# Patient Record
Sex: FEMALE | Race: WHITE | Employment: UNEMPLOYED | URBAN - METROPOLITAN AREA
[De-identification: names, ages, dates, MRNs, and addresses within clinical notes are randomized per-mention and may not be internally consistent; named-entity substitution may affect disease eponyms.]

---

## 2019-05-13 ENCOUNTER — OFFICE VISIT (OUTPATIENT)
Dept: SURGERY | Age: 34
End: 2019-05-13

## 2019-05-13 VITALS
SYSTOLIC BLOOD PRESSURE: 118 MMHG | DIASTOLIC BLOOD PRESSURE: 82 MMHG | HEART RATE: 66 BPM | TEMPERATURE: 98.9 F | WEIGHT: 205 LBS | BODY MASS INDEX: 30.36 KG/M2 | HEIGHT: 69 IN

## 2019-05-13 DIAGNOSIS — R10.33 PERIUMBILICAL ABDOMINAL PAIN: Primary | ICD-10-CM

## 2019-05-13 NOTE — PROGRESS NOTES
Surgery Consult:  Periumbilical pain Requesting physician:  Petty Pritchrad NP Subjective:  
Patient 28 y.o.  female presents with right periumbilical pain for 5 days. patient has constant dull achy pain with intermittent sharp pain which is worse with lifting and twisting. Patient has mild intermittent nausea but no emesis. Denies any bulge. Patient denies any recent trauma but [place]. Patient reports have loose BM, but no diarrhea. Prior abdominal surgeries include abdominoplasty and umbilical hernia repair. Past Medical & Surgical History: 
Past Medical History:  
Diagnosis Date  Anxiety  Other ill-defined conditions(799.89) Preventative mastectomy bilateral  
 Postpartum depression  Unspecified breast disorder   
 brca pos. bilateral mastectomy Past Surgical History:  
Procedure Laterality Date  BREAST SURGERY PROCEDURE UNLISTED  HX BREAST AUGMENTATION Bilateral 1/20/2016 REVISION BILATERAL BREAST RECONSTRUCTION WITH IMPLANT EXCHANGE AND AUTOLOGUS TISSUE FAT TRANSFER, REVISION RIGHT NIPPLE RECONSTRUCTION, UMBILICAL HERNIA REPAIR, EXCISON SKIN SUBCUTANEOUS TISSUE OF ABDOMINAL WALL performed by Lalo Ochoa MD at [facility] MAIN OR  
 HX BREAST AUGMENTATION Bilateral 1/20/2016 BREAST REVISION WITH LIPOSUCTION AND FAT INJECTION performed by Lalo Ochoa MD at [address] HX BREAST RECONSTRUCTION  11/5/2013 BREAST RECONSTRUCTION performed by Lalo Ochoa MD at [address] HX BREAST RECONSTRUCTION  11/18/2013 DEBRIDEMENT OF BILATERAL BREAST  performed by Lalo Ochoa MD at [address] HX BREAST RECONSTRUCTION  2/6/2014 BILATERAL TISSUE EXCHANGE TO PERMANENT IMPLANTS/ REVISION BILATERAL RECONSTRUCTION W/ FAT GRAFTING  performed by Lalo Ochoa MD at [address] HX BREAST RECONSTRUCTION  2/6/2014  BREAST TISSUE EXPANDER INSERTION/EXCHANGE performed by Lalo Ochoa MD at [address] [facility] HX BREAST RECONSTRUCTION  5/21/2014 BILATERAL NIPPLE RECONSTRUCTION, REVISION BILATERAL BREAST RECONSTRUCTION WITH FAT GRAFTING performed by Desiree Anderson MD at [address] [facility] HX BREAST RECONSTRUCTION  8/18/2014 REVISION BILATERAL BREAST RECONSTRUCTION WITH FAT GRAFTING, BILATERAL NIPPLE RECONSTRUCTION, RIGHT IMPLANT EXCHANGE AND FLEX HD PLACEMENT performed by Desiree Anderson MD at [address] [facility] HX MODIFIED RADICAL MASTECTOMY  11/5/2013 BILATERAL SKIN AND NIPPLE SPARING MASTECTOMY, BILATERAL BREAST RECONSTRUCTION WITH TISSUE EXPANDERS AND FLEX HD performed by Lauren Delgado MD at [address] [facility] Social History: 
Social History Socioeconomic History  Marital status:  Spouse name: Not on file  Number of children: Not on file  Years of education: Not on file  Highest education level: Not on file Occupational History  Not on file Social Needs  Financial resource strain: Not on file  Food insecurity:  
  Worry: Not on file Inability: Not on file  Transportation needs:  
  Medical: Not on file Non-medical: Not on file Tobacco Use  Smoking status: Former Smoker Packs/day: 0.25 Years: 5.00 Pack years: 1.25  Smokeless tobacco: Former User Substance and Sexual Activity  Alcohol use: Yes Comment: twice a year  Drug use: No  
 Sexual activity: Yes  
  Partners: Male Birth control/protection: IUD Lifestyle  Physical activity:  
  Days per week: Not on file Minutes per session: Not on file  Stress: Not on file Relationships  Social connections:  
  Talks on phone: Not on file Gets together: Not on file Attends Baptism service: Not on file Active member of club or organization: Not on file Attends meetings of clubs or organizations: Not on file Relationship status: Not on file  Intimate partner violence:  
  Fear of current or ex partner: Not on file Emotionally abused: Not on file Physically abused: Not on file Forced sexual activity: Not on file Other Topics Concern  Not on file Social History Narrative  Not on file Family History: 
Family History Problem Relation Age of Onset  Cancer Mother 39  
     breast  
 Heart Disease Mother  Cancer Maternal Grandmother 39  
     breast  
 Cancer Other   
     breast  
  
 
Medications: 
Current Outpatient Medications Medication Sig  
 HYDROmorphone (DILAUDID) 2 mg tablet Take 1 Tab by mouth every four (4) hours as needed for Pain. Max Daily Amount: 12 mg.  ibuprofen (MOTRIN) 600 mg tablet Take 1 Tab by mouth every six (6) hours as needed for Pain.  oxyCODONE-acetaminophen (PERCOCET) 5-325 mg per tablet Take 1 Tab by mouth every four (4) hours as needed for Pain. Max Daily Amount: 6 Tabs.  FLUoxetine (PROZAC) 20 mg tablet Take 1 Tab by mouth daily.  prenatal multivit-ca-min-fe-fa tab Take  by mouth. Indications: PREGNANCY No current facility-administered medications for this visit. Allergies: 
No Known Allergies Review of Systems A comprehensive review of systems was negative except for that written in the HPI. Objective:  
 
Exam: 
 
Visit Vitals /82 (BP 1 Location: Right arm, BP Patient Position: Sitting) Pulse 66 Temp 98.9 °F (37.2 °C) Ht 5' 9\" (1.753 m) Wt 93 kg (205 lb) BMI 30.27 kg/m² General appearance: alert, cooperative, no distress, appears stated age Eyes: negative Lungs: clear to auscultation bilaterally Heart: regular rate and rhythm Abdomen: soft, non-distended. Right periumbilical tenderness over the rectus. No fascial defect noted. Bedside US performed and no obvious hernia noted. Extremities: extremities normal, atraumatic, no cyanosis or edema. KRISSY. Skin: Skin color, texture, turgor normal. No rashes or lesions. Neurologic: Grossly normal 
 
Assessment: Right periumbilical pain most likely musculoskeletal.  Patient is tender over the rectus muscle. No hernia on US. Plan:  
 
NSAIDs prn Warm compress Abdominal pain

## 2019-05-13 NOTE — PROGRESS NOTES
Chief Complaint Patient presents with  Possible Hernia SEEN AT [address] [place] 1. Have you been to the ER, urgent care clinic since your last visit? Hospitalized since your last visit? NO 
 
2. Have you seen or consulted any other health care providers outside of the [address] [place] [address] since your last visit? Include any pap smears or colon screening.  NO